# Patient Record
Sex: MALE | Race: WHITE
[De-identification: names, ages, dates, MRNs, and addresses within clinical notes are randomized per-mention and may not be internally consistent; named-entity substitution may affect disease eponyms.]

---

## 2018-02-20 PROBLEM — Z00.00 ENCOUNTER FOR PREVENTIVE HEALTH EXAMINATION: Status: ACTIVE | Noted: 2018-02-20

## 2018-02-22 ENCOUNTER — APPOINTMENT (OUTPATIENT)
Dept: VASCULAR SURGERY | Facility: CLINIC | Age: 83
End: 2018-02-22
Payer: MEDICARE

## 2018-02-22 VITALS — OXYGEN SATURATION: 95 % | HEART RATE: 58 BPM | SYSTOLIC BLOOD PRESSURE: 120 MMHG | DIASTOLIC BLOOD PRESSURE: 61 MMHG

## 2018-02-22 DIAGNOSIS — Z87.448 PERSONAL HISTORY OF OTHER DISEASES OF URINARY SYSTEM: ICD-10-CM

## 2018-02-22 DIAGNOSIS — M79.606 PAIN IN LEG, UNSPECIFIED: ICD-10-CM

## 2018-02-22 DIAGNOSIS — Z87.891 PERSONAL HISTORY OF NICOTINE DEPENDENCE: ICD-10-CM

## 2018-02-22 DIAGNOSIS — Z86.39 PERSONAL HISTORY OF OTHER ENDOCRINE, NUTRITIONAL AND METABOLIC DISEASE: ICD-10-CM

## 2018-02-22 DIAGNOSIS — R06.9 UNSPECIFIED ABNORMALITIES OF BREATHING: ICD-10-CM

## 2018-02-22 DIAGNOSIS — Z86.79 PERSONAL HISTORY OF OTHER DISEASES OF THE CIRCULATORY SYSTEM: ICD-10-CM

## 2018-02-22 PROCEDURE — 93880 EXTRACRANIAL BILAT STUDY: CPT

## 2018-02-22 PROCEDURE — 99203 OFFICE O/P NEW LOW 30 MIN: CPT | Mod: 25

## 2018-05-24 ENCOUNTER — APPOINTMENT (OUTPATIENT)
Dept: VASCULAR SURGERY | Facility: CLINIC | Age: 83
End: 2018-05-24
Payer: MEDICARE

## 2018-05-24 VITALS — DIASTOLIC BLOOD PRESSURE: 67 MMHG | HEART RATE: 60 BPM | SYSTOLIC BLOOD PRESSURE: 119 MMHG | OXYGEN SATURATION: 98 %

## 2018-05-24 PROCEDURE — 99213 OFFICE O/P EST LOW 20 MIN: CPT | Mod: 25

## 2018-05-24 PROCEDURE — 93880 EXTRACRANIAL BILAT STUDY: CPT

## 2018-05-24 RX ORDER — CLOPIDOGREL 75 MG/1
75 TABLET, FILM COATED ORAL
Refills: 0 | Status: ACTIVE | COMMUNITY

## 2018-05-24 RX ORDER — CHROMIUM 200 MCG
TABLET ORAL
Refills: 0 | Status: ACTIVE | COMMUNITY

## 2018-05-24 RX ORDER — ALLOPURINOL 300 MG/1
300 TABLET ORAL
Refills: 0 | Status: ACTIVE | COMMUNITY

## 2018-05-24 RX ORDER — LINACLOTIDE 145 UG/1
145 CAPSULE, GELATIN COATED ORAL
Refills: 0 | Status: ACTIVE | COMMUNITY

## 2018-05-24 RX ORDER — SIMVASTATIN 40 MG/1
40 TABLET, FILM COATED ORAL
Refills: 0 | Status: ACTIVE | COMMUNITY

## 2018-05-24 RX ORDER — DOXAZOSIN 4 MG/1
4 TABLET ORAL
Refills: 0 | Status: ACTIVE | COMMUNITY

## 2018-05-24 RX ORDER — LABETALOL HYDROCHLORIDE 200 MG/1
200 TABLET, FILM COATED ORAL
Refills: 0 | Status: ACTIVE | COMMUNITY

## 2018-11-29 ENCOUNTER — APPOINTMENT (OUTPATIENT)
Dept: VASCULAR SURGERY | Facility: CLINIC | Age: 83
End: 2018-11-29
Payer: MEDICARE

## 2018-11-29 VITALS
OXYGEN SATURATION: 98 % | DIASTOLIC BLOOD PRESSURE: 64 MMHG | HEART RATE: 54 BPM | SYSTOLIC BLOOD PRESSURE: 160 MMHG | RESPIRATION RATE: 12 BRPM

## 2018-11-29 PROCEDURE — 93880 EXTRACRANIAL BILAT STUDY: CPT

## 2018-11-29 PROCEDURE — 99215 OFFICE O/P EST HI 40 MIN: CPT

## 2019-05-30 ENCOUNTER — APPOINTMENT (OUTPATIENT)
Dept: VASCULAR SURGERY | Facility: CLINIC | Age: 84
End: 2019-05-30
Payer: MEDICARE

## 2019-05-30 VITALS — DIASTOLIC BLOOD PRESSURE: 63 MMHG | SYSTOLIC BLOOD PRESSURE: 148 MMHG | OXYGEN SATURATION: 97 % | HEART RATE: 54 BPM

## 2019-05-30 PROCEDURE — 93880 EXTRACRANIAL BILAT STUDY: CPT

## 2019-05-30 PROCEDURE — 99214 OFFICE O/P EST MOD 30 MIN: CPT

## 2019-05-30 NOTE — ASSESSMENT
[FreeTextEntry1] : 83yoM here for evaluation of carotid stenosis, asymptomatic and stable on duplex today.  Will repeat carotid duplex in 6mos for surveillance, but instructed pt on how an ischemic event will present.

## 2019-05-30 NOTE — PHYSICAL EXAM
[Normal Thyroid] : the thyroid was normal [Wheezing] : wheezing was heard [Normal Heart Sounds] : normal heart sounds [Normal Rate and Rhythm] : normal rate and rhythm [2+] : left 2+ [No Rash or Lesion] : No rash or lesion [Alert] : alert [Calm] : calm [JVD] : no jugular venous distention  [Carotid Bruits] : no carotid bruits [Right Carotid Bruit] : no bruit heard over the right carotid [Left Carotid Bruit] : no bruit heard over the left carotid [Ankle Swelling (On Exam)] : not present [de-identified] : Obese habitus, NAD [de-identified] : NC/AT, ericictkayla, EOMIx6 [de-identified] : Large pannus, SNTND [de-identified] : FROM throughout, strength 5/5x4, neg Romberg/no pronator drift

## 2019-05-30 NOTE — PROCEDURE
[FreeTextEntry1] : Duplex reveals stable COURTNEY stenosis 50-69%, stable 70% LICA stenosis, PSV in L ICA 327cm/sec

## 2019-05-30 NOTE — HISTORY OF PRESENT ILLNESS
[FreeTextEntry1] : 82yo Kenyan M returning for reevaluation of asymptomatic ICA stenosis.  He has PMH of HTN, HLD, bilateral knee pain, SOB, s/p L eye cataract surgery, still w/residual floaters in the visual field.  He is s/p CVA in 2012, he just remembers having trouble with speaking, does not remember work up, no sequelae.\par \par He still denies current symptoms of TIA/CVA.

## 2019-08-22 ENCOUNTER — APPOINTMENT (OUTPATIENT)
Dept: VASCULAR SURGERY | Facility: CLINIC | Age: 84
End: 2019-08-22
Payer: MEDICARE

## 2019-08-22 PROCEDURE — 93880 EXTRACRANIAL BILAT STUDY: CPT

## 2019-08-22 PROCEDURE — 99214 OFFICE O/P EST MOD 30 MIN: CPT

## 2019-08-27 NOTE — PHYSICAL EXAM
[Normal Thyroid] : the thyroid was normal [Wheezing] : wheezing was heard [Normal Heart Sounds] : normal heart sounds [Normal Rate and Rhythm] : normal rate and rhythm [2+] : left 2+ [No Rash or Lesion] : No rash or lesion [Alert] : alert [Calm] : calm [JVD] : no jugular venous distention  [Carotid Bruits] : no carotid bruits [Right Carotid Bruit] : no bruit heard over the right carotid [Left Carotid Bruit] : no bruit heard over the left carotid [Ankle Swelling (On Exam)] : not present [de-identified] : Obese habitus, NAD [de-identified] : NC/AT, ericictkayla, EOMIx6 [de-identified] : Large pannus, SNTND [de-identified] : FROM throughout, strength 5/5x4, neg Romberg/no pronator drift

## 2019-08-27 NOTE — HISTORY OF PRESENT ILLNESS
[FreeTextEntry1] : 82yo Bangladeshi M returning for reevaluation of asymptomatic ICA stenosis.  He has PMH of HTN, HLD, bilateral knee pain, SOB, s/p L eye cataract surgery, still w/residual floaters in the visual field.  He is s/p CVA in 2012, he just remembers having trouble with speaking, does not remember work up, no sequelae.\par \par He still denies current symptoms of TIA/CVA. was here three months ago with LICA 327/40

## 2019-08-27 NOTE — ASSESSMENT
[FreeTextEntry1] : 83yoM here for evaluation of carotid stenosis, asymptomatic and stable on duplex today. Will repeat carotid duplex in 3mos for surveillance, but instructed pt on how an ischemic event will present.

## 2019-11-21 ENCOUNTER — APPOINTMENT (OUTPATIENT)
Dept: VASCULAR SURGERY | Facility: CLINIC | Age: 84
End: 2019-11-21
Payer: MEDICARE

## 2019-11-21 VITALS
SYSTOLIC BLOOD PRESSURE: 140 MMHG | HEIGHT: 72 IN | DIASTOLIC BLOOD PRESSURE: 46 MMHG | OXYGEN SATURATION: 98 % | BODY MASS INDEX: 33.18 KG/M2 | WEIGHT: 245 LBS | HEART RATE: 48 BPM | TEMPERATURE: 97.9 F

## 2019-11-21 PROCEDURE — 99214 OFFICE O/P EST MOD 30 MIN: CPT

## 2019-11-21 PROCEDURE — 93880 EXTRACRANIAL BILAT STUDY: CPT

## 2019-11-22 NOTE — PHYSICAL EXAM
[Normal Thyroid] : the thyroid was normal [Wheezing] : wheezing was heard [Normal Rate and Rhythm] : normal rate and rhythm [Normal Heart Sounds] : normal heart sounds [2+] : left 2+ [No Rash or Lesion] : No rash or lesion [Alert] : alert [Calm] : calm [JVD] : no jugular venous distention  [Right Carotid Bruit] : no bruit heard over the right carotid [Carotid Bruits] : no carotid bruits [Ankle Swelling (On Exam)] : not present [Left Carotid Bruit] : no bruit heard over the left carotid [de-identified] : Large pannus, SNTND [de-identified] : NC/AT, ericictkayla, EOMIx6 [de-identified] : FROM throughout [de-identified] : Obese habitus, NAD

## 2019-11-22 NOTE — ASSESSMENT
[FreeTextEntry1] : 83 yo M here for evaluation of carotid stenosis. Carotis US remains stable on the left and slightly higher on the right. At this time, no intervention is needed, will continue to monitor and will see him back in 3 months for repeat scan.

## 2019-11-22 NOTE — HISTORY OF PRESENT ILLNESS
[FreeTextEntry1] : 84yo Venezuelan M returning for reevaluation of asymptomatic Left ICA stenosis.  He has PMH of HTN, HLD, bilateral knee pain, SOB, s/p L eye cataract surgery, still w/residual floaters in the visual field.  He had TIA in 2012, difficulty speaking during that time, no residual effects, does not remember workup. \par \par He still denies current symptoms of TIA/CVA. was here three months ago with LICA 297/66, stable from previous study

## 2020-02-20 ENCOUNTER — APPOINTMENT (OUTPATIENT)
Dept: VASCULAR SURGERY | Facility: CLINIC | Age: 85
End: 2020-02-20
Payer: MEDICARE

## 2020-02-20 PROCEDURE — 99214 OFFICE O/P EST MOD 30 MIN: CPT

## 2020-02-20 PROCEDURE — 93880 EXTRACRANIAL BILAT STUDY: CPT

## 2020-02-20 NOTE — ASSESSMENT
[FreeTextEntry1] : 86 yo M here for evaluation of carotid stenosis. Carotis US remains stable on the left and slightly higher on the right. At this time, no intervention is needed, will continue to monitor and will see him back in 3 months for repeat scan.

## 2020-02-20 NOTE — PHYSICAL EXAM
[JVD] : no jugular venous distention  [Normal Thyroid] : the thyroid was normal [Carotid Bruits] : no carotid bruits [Normal Heart Sounds] : normal heart sounds [Wheezing] : wheezing was heard [Normal Rate and Rhythm] : normal rate and rhythm [Right Carotid Bruit] : no bruit heard over the right carotid [Left Carotid Bruit] : no bruit heard over the left carotid [2+] : right 2+ [Ankle Swelling (On Exam)] : not present [No Rash or Lesion] : No rash or lesion [Calm] : calm [de-identified] : Obese habitus, NAD [Alert] : alert [de-identified] : Large pannus, SNTND [de-identified] : NC/AT, ericictkayla, EOMIx6 [de-identified] : FROM throughout

## 2020-02-20 NOTE — HISTORY OF PRESENT ILLNESS
[FreeTextEntry1] : 86 yo Montenegrin M returning for reevaluation of asymptomatic Left ICA stenosis.  He has PMH of HTN, HLD, bilateral knee pain, SOB, s/p L eye cataract surgery, still w/residual floaters in the visual field.  He had TIA in 2012, difficulty speaking during that time, no residual effects, does not remember having workup. \par \par He still denies current symptoms of TIA/CVA. was here three months ago with LICA 308/59.

## 2020-05-21 ENCOUNTER — APPOINTMENT (OUTPATIENT)
Dept: VASCULAR SURGERY | Facility: CLINIC | Age: 85
End: 2020-05-21
Payer: MEDICARE

## 2020-05-21 PROCEDURE — 93880 EXTRACRANIAL BILAT STUDY: CPT

## 2020-05-21 PROCEDURE — 99214 OFFICE O/P EST MOD 30 MIN: CPT

## 2020-05-21 NOTE — HISTORY OF PRESENT ILLNESS
[FreeTextEntry1] : 84 yo Martiniquais M returning for reevaluation of asymptomatic Left ICA stenosis.  He has PMH of HTN, HLD, bilateral knee pain, SOB, s/p L eye cataract surgery, still w/residual floaters in the visual field.  He had TIA in 2012, difficulty speaking during that time, no residual effects, does not remember having workup at that time. \par \par He still denies current symptoms of TIA/CVA.  three months ago LICA 331/75.

## 2020-05-21 NOTE — PHYSICAL EXAM
[Normal Thyroid] : the thyroid was normal [Normal Heart Sounds] : normal heart sounds [Wheezing] : wheezing was heard [Normal Rate and Rhythm] : normal rate and rhythm [2+] : left 2+ [Alert] : alert [No Rash or Lesion] : No rash or lesion [Calm] : calm [JVD] : no jugular venous distention  [Carotid Bruits] : no carotid bruits [Right Carotid Bruit] : no bruit heard over the right carotid [Left Carotid Bruit] : no bruit heard over the left carotid [Ankle Swelling (On Exam)] : not present [de-identified] : Obese habitus, NAD [de-identified] : NC/AT, ericictkayla, EOMIx6 [de-identified] : Large pannus, SNTND [de-identified] : FROM throughout

## 2020-05-21 NOTE — ASSESSMENT
[FreeTextEntry1] : 86 yo M here for evaluation of carotid stenosis. Carotid US shows stable left ICA, currently asymptomatic. Will continue with close monitoring every three months with ultrasound. Continue Statin/Plavix.

## 2020-08-19 PROBLEM — I65.23 CAROTID STENOSIS, ASYMPTOMATIC, BILATERAL: Status: ACTIVE | Noted: 2018-02-22

## 2020-08-20 ENCOUNTER — APPOINTMENT (OUTPATIENT)
Dept: VASCULAR SURGERY | Facility: CLINIC | Age: 85
End: 2020-08-20
Payer: MEDICARE

## 2020-08-20 DIAGNOSIS — I65.23 OCCLUSION AND STENOSIS OF BILATERAL CAROTID ARTERIES: ICD-10-CM

## 2020-08-20 PROCEDURE — 99214 OFFICE O/P EST MOD 30 MIN: CPT

## 2020-08-20 PROCEDURE — 93880 EXTRACRANIAL BILAT STUDY: CPT

## 2020-08-20 NOTE — ASSESSMENT
[FreeTextEntry1] : 86 yo M here for evaluation of carotid stenosis. Carotid US shows stable left ICA, currently asymptomatic. Will continue with close monitoring, will see him back in 6 months for repeat ultrasound. Continue Statin/Plavix. For the weight loss, recommend he FU with his PCP.

## 2020-08-20 NOTE — HISTORY OF PRESENT ILLNESS
[FreeTextEntry1] : 84 yo Salvadorean M returning for reevaluation of asymptomatic left ICA stenosis.  He has PMH of HTN, HLD, bilateral knee pain, SOB, s/p L eye cataract surgery, still w/residual floaters in the visual field.  He had TIA in 2012, difficulty speaking during that time, no residual effects, does not remember having workup at that time. Complaining of weight loss, states he had a colonoscopy recenlty and was normal. \par \par He still denies current symptoms of TIA/CVA. Three months ago LICA 328/53

## 2020-08-20 NOTE — PHYSICAL EXAM
[Wheezing] : wheezing was heard [Normal Thyroid] : the thyroid was normal [Normal Heart Sounds] : normal heart sounds [Normal Rate and Rhythm] : normal rate and rhythm [2+] : left 2+ [No Rash or Lesion] : No rash or lesion [Alert] : alert [Calm] : calm [JVD] : no jugular venous distention  [Carotid Bruits] : no carotid bruits [Ankle Swelling (On Exam)] : not present [Left Carotid Bruit] : no bruit heard over the left carotid [Right Carotid Bruit] : no bruit heard over the right carotid [de-identified] : Obese habitus, NAD [de-identified] : NC/AT, ericictkayla, EOMIx6 [de-identified] : Large pannus, SNTND [de-identified] : FROM throughout

## 2021-02-25 ENCOUNTER — APPOINTMENT (OUTPATIENT)
Dept: VASCULAR SURGERY | Facility: CLINIC | Age: 86
End: 2021-02-25
Payer: MEDICARE

## 2021-02-25 PROCEDURE — 93880 EXTRACRANIAL BILAT STUDY: CPT

## 2021-02-25 PROCEDURE — 99213 OFFICE O/P EST LOW 20 MIN: CPT

## 2021-02-26 NOTE — REASON FOR VISIT
[Follow-Up: _____] : a [unfilled] follow-up visit [Spouse] : spouse [FreeTextEntry1] : Bilateral asymptomatic carotid artery stenosis

## 2021-02-26 NOTE — PHYSICAL EXAM
[Normal Thyroid] : the thyroid was normal [Wheezing] : wheezing was heard [Normal Heart Sounds] : normal heart sounds [Normal Rate and Rhythm] : normal rate and rhythm [2+] : left 2+ [No Rash or Lesion] : No rash or lesion [Alert] : alert [Calm] : calm [JVD] : no jugular venous distention  [Carotid Bruits] : no carotid bruits [Right Carotid Bruit] : no bruit heard over the right carotid [Left Carotid Bruit] : no bruit heard over the left carotid [Ankle Swelling (On Exam)] : not present [de-identified] : Obese habitus, NAD [de-identified] : NC/AT, ericictkayal, EOMIx6 [de-identified] : Large pannus, SNTND [de-identified] : FROM throughout

## 2021-02-26 NOTE — ASSESSMENT
[FreeTextEntry1] : 84 yo M here for evaluation of carotid stenosis. Carotid US shows stable left ICA, currently asymptomatic. Will continue with close monitoring, will see him back in 3 months for repeat ultrasound. Continue Statin/Plavix.

## 2021-02-26 NOTE — PROCEDURE
[FreeTextEntry1] : Duplex reveals right ICA 50-69%  and left ICA 70% 323/44 (previously 302/60 in August 2020); stable.

## 2021-02-26 NOTE — HISTORY OF PRESENT ILLNESS
[FreeTextEntry1] : 86 yo Br[place] M returning for reevaluation of asymptomatic left ICA stenosis.  He has PMH of HTN, HLD, bilateral knee pain, SOB, s/p L eye cataract surgery, still w/residual floaters in the visual field.  He had TIA in 2012, difficulty speaking during that time, no residual effects, does not remember having workup at that time.\par \par Today patient denies any symptoms of recent or new onset focal neurological deficits. Has been taking his plavix/statin. Patient does state that he has recently been having CP and SOB, and is followed by a cardiologist at [facility] for which he is tentatively planned to undergo cardiac stenting on March 24th, 2021.

## 2021-05-27 ENCOUNTER — APPOINTMENT (OUTPATIENT)
Dept: VASCULAR SURGERY | Facility: CLINIC | Age: 86
End: 2021-05-27